# Patient Record
Sex: FEMALE | Race: WHITE | NOT HISPANIC OR LATINO | URBAN - METROPOLITAN AREA
[De-identification: names, ages, dates, MRNs, and addresses within clinical notes are randomized per-mention and may not be internally consistent; named-entity substitution may affect disease eponyms.]

---

## 2023-07-19 ENCOUNTER — HOSPITAL ENCOUNTER (OUTPATIENT)
Dept: DATA CONVERSION | Facility: HOSPITAL | Age: 48
End: 2023-07-19
Attending: ORAL & MAXILLOFACIAL SURGERY | Admitting: ORAL & MAXILLOFACIAL SURGERY

## 2023-07-19 DIAGNOSIS — M27.40 UNSPECIFIED CYST OF JAW: ICD-10-CM

## 2023-07-19 DIAGNOSIS — K01.1 IMPACTED TEETH: ICD-10-CM

## 2023-07-19 DIAGNOSIS — K09.0 DEVELOPMENTAL ODONTOGENIC CYSTS: ICD-10-CM

## 2023-07-31 LAB
COMPLETE PATHOLOGY REPORT: NORMAL
CONVERTED CLINICAL DIAGNOSIS-HISTORY: NORMAL
CONVERTED FINAL DIAGNOSIS: NORMAL
CONVERTED FINAL REPORT PDF LINK TO COPY AND PASTE: NORMAL
CONVERTED GROSS DESCRIPTION: NORMAL

## 2023-09-29 VITALS — WEIGHT: 169.75 LBS

## 2023-09-30 NOTE — H&P
"    History & Physical Reviewed:   Pregnant/Lactating:  ·  Are You Pregnant no (1)   ·  Are You Currently Breastfeeding no (1)     I have reviewed the History and Physical dated:  19-Jul-2023   History and Physical reviewed and relevant findings noted. Patient examined to review pertinent physical  findings.: No significant changes   Home Medications Reviewed: no changes noted   Allergies Reviewed: no changes noted       ERAS (Enhanced Recovery After Surgery):  ·  ERAS Patient: no     Consent:   COVID-19 Consent:  ·  COVID-19 Risk Consent Surgeon has reviewed key risks related to the risk of sho COVID-19 and if they contract COVID-19 what the risks are.       Electronic Signatures:  Robert Ellsworth)  (Signed 19-Jul-2023 07:29)   Authored: History & Physical Reviewed, ERAS, Consent,  Note Completion      Last Updated: 28-Jul-2023 12:20 by Mara Henao (HIM)    References:  1.  Data Referenced From \"Patient Profile - Preop v3\" 19-Jul-2023 06:10   "

## 2023-10-02 NOTE — OP NOTE
Post Operative Note:     PreOp Diagnosis: cyst left mandible   Post-Procedure Diagnosis: same   Procedure: 1. Excision of cyst left mandible with  intraoral osteotomy  2. Surgical removal of complicated complete bony impacted tooth #17  3. Surgical removal of tooth #19  4.   5.   Surgeon: Seng   Resident/Fellow/Other Assistant: Angela   Estimated Blood Loss (mL): 10   Specimen: yes. cyst left mandible with tooth #17   Complications: none   Findings: Cyst with lingual and crestal perforation  of cortex. Removed entirely. Mandible intact.   Patient Returned To/Condition: PACU/stable     Operative Report Dictated:  Dictation: not applicable - note contains Operative  Report   Note Recipients: Robert Ellsworth MD Sood, MD Remigio - [phone] []   Operative Report:    Patient is a 48-year-old female who presented with a cyst in her left posterior mandible associated with impacted tooth #17.  The cyst was previously biopsied and  returned via [facility] oral pathology as a glandular odontogenic cyst.  The patient presents today for excision of the cystic lesion, removal of the associated impacted tooth, and removal of tooth #19 which was damaged by the expansion of the cyst.   On imaging there is noted to be significant cortical perforation of the cyst lingually and crestally.    The patient was seen and evaluated in the preoperative holding area by the surgical and anesthesia teams.  Appropriate consents were reviewed.  All questions were answered.  The patient received preoperative antibiotics with 1 g of Ancef and preoperative  steroids with IV Decadron.  The patient was transferred to the operating room.  She was transferred from the [facility] to the operating room table and secured by the operating room staff.  Appropriate ASA monitors were placed.  A surgical huddle was performed.   The patient was then induced under general anesthesia and intubated without difficulty via the nasal endotracheal tube.   After confirmation of the tube position by the anesthesia team the tube was secured by the surgical team.  The patient was prepped  and draped with the standard sterile fashion with Betadine solution.  A surgical timeout was performed.  A throat pack was placed that was removed prior to the end of the case.  A bite-block was placed.  A circular incision was made around tooth #19 with  a distobuccal release in the standard third molar fashion to the buccal.  Mucoperiosteal flap was raised with a #9 periosteal elevator.  There is noted to be perforation by the cysts on the crest of the ridge in the second and third molar region.  A full-thickness  mucoperiosteal flap was raised along the crest of the mandible towards the lingual as well as the buccal lingual tissues were carefully retracted as to avoid injury to the lingual nerve.  An intraoral osteotomy was made with a round bur under copious  saline irrigation on the crest of the left mandible.  The cystic lesion was identified.  This was removed with a double-ended curette.  The cyst was noted to be filled with thick yellow to brown material.  The cyst was easily dissected off of the area  of the large perforation on the lingual cortex.  Impacted tooth #17 was identified.  The tooth was sectioned and removed atraumatically.  The root of the tooth was noted to perforate the lingual cortex.  The inferior alveolar nerve was visible at the  base of the socket was noted to be intact.  Tooth #19 was then sectioned and removed atraumatically.  There is noted to be significant distal root resorption from the cystic lesion on the root of the tooth #19.  Bony margins were smoothed tissues were  irrigated copiously with antibiotic impregnated saline solution.  The mandible is noted to be intact with continuity.  The tissues were then closed with 3-0 Chromic Gut suture.  The throat screen and bite block were removed and orogastric tube was placed  to empty the contents of the  stomach.  The patient was then emerged from anesthesia, extubated, and transferred to the PACU in stable condition.    Attestation:   Note Completion:  Attending Attestation I was present for the entire procedure         Electronic Signatures:  Robert Ellsworth)  (Signed 25-Jul-2023 10:06)   Authored: Post Operative Note, Note Completion      Last Updated: 25-Jul-2023 10:06 by Robert Ellsworth)